# Patient Record
Sex: FEMALE | Race: ASIAN
[De-identification: names, ages, dates, MRNs, and addresses within clinical notes are randomized per-mention and may not be internally consistent; named-entity substitution may affect disease eponyms.]

---

## 2022-04-22 ENCOUNTER — RESULT REVIEW (OUTPATIENT)
Age: 47
End: 2022-04-22

## 2022-05-27 ENCOUNTER — TRANSCRIPTION ENCOUNTER (OUTPATIENT)
Age: 47
End: 2022-05-27

## 2022-05-27 ENCOUNTER — RESULT REVIEW (OUTPATIENT)
Age: 47
End: 2022-05-27

## 2022-06-20 ENCOUNTER — NON-APPOINTMENT (OUTPATIENT)
Age: 47
End: 2022-06-20

## 2022-06-24 ENCOUNTER — OUTPATIENT (OUTPATIENT)
Dept: OUTPATIENT SERVICES | Facility: HOSPITAL | Age: 47
LOS: 1 days | End: 2022-06-24

## 2022-06-24 ENCOUNTER — RESULT REVIEW (OUTPATIENT)
Age: 47
End: 2022-06-24

## 2022-06-24 ENCOUNTER — APPOINTMENT (OUTPATIENT)
Dept: MRI IMAGING | Facility: CLINIC | Age: 47
End: 2022-06-24
Payer: MEDICAID

## 2022-06-24 PROBLEM — Z00.00 ENCOUNTER FOR PREVENTIVE HEALTH EXAMINATION: Status: ACTIVE | Noted: 2022-06-24

## 2022-06-24 PROCEDURE — 19085 BX BREAST 1ST LESION MR IMAG: CPT | Mod: LT

## 2022-06-24 PROCEDURE — 88305 TISSUE EXAM BY PATHOLOGIST: CPT | Mod: 26

## 2022-06-24 PROCEDURE — 77065 DX MAMMO INCL CAD UNI: CPT | Mod: 26,LT

## 2022-06-27 LAB — SURGICAL PATHOLOGY STUDY: SIGNIFICANT CHANGE UP

## 2022-12-22 ENCOUNTER — APPOINTMENT (OUTPATIENT)
Dept: MRI IMAGING | Facility: CLINIC | Age: 47
End: 2022-12-22

## 2022-12-22 ENCOUNTER — OUTPATIENT (OUTPATIENT)
Dept: OUTPATIENT SERVICES | Facility: HOSPITAL | Age: 47
LOS: 1 days | End: 2022-12-22

## 2022-12-22 PROCEDURE — 77049 MRI BREAST C-+ W/CAD BI: CPT | Mod: 26

## 2023-03-08 ENCOUNTER — APPOINTMENT (OUTPATIENT)
Dept: MAMMOGRAPHY | Facility: CLINIC | Age: 48
End: 2023-03-08
Payer: MEDICAID

## 2023-03-08 ENCOUNTER — APPOINTMENT (OUTPATIENT)
Dept: ULTRASOUND IMAGING | Facility: CLINIC | Age: 48
End: 2023-03-08
Payer: MEDICAID

## 2023-03-08 ENCOUNTER — OUTPATIENT (OUTPATIENT)
Dept: OUTPATIENT SERVICES | Facility: HOSPITAL | Age: 48
LOS: 1 days | End: 2023-03-08

## 2023-03-08 PROCEDURE — G0279: CPT | Mod: 26

## 2023-03-08 PROCEDURE — 77066 DX MAMMO INCL CAD BI: CPT | Mod: 26

## 2024-01-20 ENCOUNTER — EMERGENCY (EMERGENCY)
Facility: HOSPITAL | Age: 49
LOS: 0 days | Discharge: HOME | End: 2024-01-20
Attending: STUDENT IN AN ORGANIZED HEALTH CARE EDUCATION/TRAINING PROGRAM
Payer: MEDICAID

## 2024-01-20 VITALS
HEIGHT: 71 IN | RESPIRATION RATE: 18 BRPM | TEMPERATURE: 98 F | DIASTOLIC BLOOD PRESSURE: 67 MMHG | SYSTOLIC BLOOD PRESSURE: 154 MMHG | WEIGHT: 149.91 LBS | HEART RATE: 70 BPM | OXYGEN SATURATION: 100 %
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
## 2024-01-20 DIAGNOSIS — M25.571 PAIN IN RIGHT ANKLE AND JOINTS OF RIGHT FOOT: ICD-10-CM
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
## 2024-01-20 DIAGNOSIS — S82.831A OTHER FRACTURE OF UPPER AND LOWER END OF RIGHT FIBULA, INITIAL ENCOUNTER FOR CLOSED FRACTURE: ICD-10-CM

## 2024-01-20 DIAGNOSIS — M25.561 PAIN IN RIGHT KNEE: ICD-10-CM

## 2024-01-20 PROCEDURE — 73610 X-RAY EXAM OF ANKLE: CPT | Mod: 26,RT

## 2024-01-20 PROCEDURE — 29515 APPLICATION SHORT LEG SPLINT: CPT | Mod: RT

## 2024-01-20 PROCEDURE — 99284 EMERGENCY DEPT VISIT MOD MDM: CPT | Mod: 25

## 2024-01-20 PROCEDURE — 73610 X-RAY EXAM OF ANKLE: CPT | Mod: RT

## 2024-01-20 PROCEDURE — 73590 X-RAY EXAM OF LOWER LEG: CPT | Mod: RT

## 2024-01-20 PROCEDURE — 27786 TREATMENT OF ANKLE FRACTURE: CPT | Mod: 54,RT

## 2024-01-20 PROCEDURE — 99284 EMERGENCY DEPT VISIT MOD MDM: CPT | Mod: 57

## 2024-01-20 PROCEDURE — 73562 X-RAY EXAM OF KNEE 3: CPT | Mod: RT

## 2024-01-20 PROCEDURE — 73590 X-RAY EXAM OF LOWER LEG: CPT | Mod: 26,RT

## 2024-01-20 PROCEDURE — 73562 X-RAY EXAM OF KNEE 3: CPT | Mod: 26,RT

## 2024-01-20 RX ORDER — ACETAMINOPHEN 500 MG
650 TABLET ORAL ONCE
Refills: 0 | Status: COMPLETED | OUTPATIENT
Start: 2024-01-20 | End: 2024-01-20

## 2024-01-20 RX ADMIN — Medication 650 MILLIGRAM(S): at 09:41

## 2024-01-20 NOTE — ED ADULT TRIAGE NOTE - CHIEF COMPLAINT QUOTE
Patient BIBEMS for a slip and fall on ice approximately 1 hour PTA. Patient endorses right ankle pain and immobilized by EMS. Denies head trauma, LOC, or blood thinner use.

## 2024-01-20 NOTE — ED PROVIDER NOTE - PATIENT PORTAL LINK FT
You can access the FollowMyHealth Patient Portal offered by [facility] by registering at the following website: [email or web address]. By joining [employer]’s FollowMyHealth portal, you will also be able to view your health information using other applications (apps) compatible with our system.

## 2024-01-20 NOTE — ED PROVIDER NOTE - CLINICAL SUMMARY MEDICAL DECISION MAKING FREE TEXT BOX
.    49 y/o F pmh breast CA p/w R ankle pain and swelling s/p mechanical fall today, slipping on ice. No LOC, head or neck pain, weakness, numbness, cp, sob. Pt cannot ambulate 2/2 pain. No blood thinners    Pt is NAD; + swelling to R lat mal; + ttp ankle diffusely; 2+ DP pulses, moves toes well; NL sensation; NL ROM proximally, c/o pain to R knee, but non ttp; exam o/w atraumatic and non ttp.    All available lab tests, imaging tests, and EKGs independently reviewed and interpreted by me.   xray + R Distal Fibula frx.  Splint and crutch applied.    Discussed all available results with Pt.  Pt understands results, plan of care, outpt ortho follow up as discussed, splint care, and signs and symptoms for ED return.  Pt is comfortable with discharge. DC home.     .

## 2024-01-20 NOTE — ED PROVIDER NOTE - OBJECTIVE STATEMENT
Pt is a 48 y.o Female with PMHx of right sided breaast cancer s/p radiation, chemotherapy and lumpectomy BIBA to the ED with chief complaint of slip and fall on ice approximately 1 hour PTA. Denies any Head trauma, LOC, AC use. Pt states she was not on ground for very long. Pt complaints of right knee pain and ankle pain. Denies any numbness tingling or focal weakness. Denies any HA, neck pain, back pain, CP, SOB or presyncopal symptoms.

## 2024-01-20 NOTE — ED PROVIDER NOTE - NSFOLLOWUPINSTRUCTIONS_ED_ALL_ED_FT
Our Emergency Department Referral Coordinators will be reaching out to you in the next 24-48 hours from 9:00am to 5:00pm with a follow up appointment. Please expect a phone call from the hospital in that time frame. If you do not receive a call or if you have any questions or concerns, you can reach them at   [phone]    Ankle Fracture    The ankle joint is made up of the lower (distal) sections of the lower leg bones, called the tibia and fibula, along with a bone in the foot called the talus. An ankle fracture is a break in one, two, or all three of these sections of bone. There are two general types of ankle fractures:  Stable fracture. This happens when one of the bones is broken, but the bones of the ankle joint stay in their normal positions.  Unstable fracture. This type can include more than one broken bone. It can also happen if the outer bone is broken and the strong tissues that connect bones to each other (ligaments) are also injured at the inner ankle. This type of fracture allows the talus to move out of its normal position.  What are the causes?  This condition may be caused by:  A hard, direct hit to the ankle.  Quickly and severely twisting your ankle, often while your foot is planted and the rest of your body is moving.  Trauma, such as from a car crash or a fall from a height.  What increases the risk?  The following factors may make you more likely to develop this condition:  Being overweight.  Participating in sports that involve quick direction changes, as in soccer.  Doing high-impact sports such as gymnastics or football.  What are the signs or symptoms?    Symptoms of this condition include:  A tender and swollen ankle.  Bruising around your injured ankle.  Pain when moving or pressing on your ankle.  Trouble walking or using your ankle to support your body weight (putting weight on your ankle).  Pain that gets worse when you move your foot or ankle or when you stand.  Pain that gets better with rest.  How is this diagnosed?  An ankle fracture is usually diagnosed with a physical exam and X-rays. You may also have a CT scan or an MRI.    How is this treated?  Treatment for this condition depends on the type of ankle fracture you have. Stable fractures are treated with a cast, boot, or splint to hold the ankle still and crutches to avoid putting weight on the ankle until the fracture heals. Unstable fractures require surgery to ensure that the bones heal properly. After surgery, you will have a splint. After your incision has healed, your surgeon may give you a cast or a boot. You will not be able to put weight on your injured side for several weeks.    After your ankle has healed, you will do physical therapy exercises to improve movement and strength in your ankle.    Follow these instructions at home:  If you have a boot or splint:    Wear the boot or splint as told by your health care provider. Remove it only as told by your health care provider.  Loosen it if your toes tingle, become numb, or turn cold and blue.  Keep it clean and dry.  If you have a cast:    Do not put pressure on any part of the cast until it is fully hardened. This may take several hours.  Do not stick anything inside the cast to scratch your skin. Doing that increases your risk of infection.  Check the skin around the cast every day. Tell your health care provider about any concerns.  You may put lotion on dry skin around the edges of the cast. Do not put lotion on the skin underneath the cast.  Keep it clean and dry.  Bathing    Do not take baths, swim, or use a hot tub until your health care provider approves. Ask your health care provider if you may take showers. You may only be allowed to take sponge baths.  If the cast, boot, or splint is not waterproof:  Do not let it get wet.  Cover it with a watertight covering when you take a bath or shower.  Managing pain, stiffness, and swelling      If directed, put ice on the injured area. To do this:  If you have a removable splint or boot, remove it as told by your health care provider.  Put ice in a plastic bag.  Place a towel between your skin and the bag or between your cast and the bag.  Leave the ice on for 20 minutes, 2–3 times a day.  Remove the ice if your skin turns bright red. This is very important. If you cannot feel pain, heat, or cold, you have a greater risk of damage to the area.  Move your toes often to reduce stiffness and swelling.  Raise (elevate) the injured area above the level of your heart while you are sitting or lying down.  Activity    Do exercises as told by your health care provider.  Return to your normal activities as told by your health care provider. Ask your health care provider what activities are safe for you.  Do not use the injured limb to support your body weight until your health care provider says that you can. Use crutches as told by your health care provider.  General instructions    Take over-the-counter and prescription medicines only as told by your health care provider.  Ask your health care provider when it is safe to drive if you have a cast, boot, or splint on your ankle.  Do not use any products that contain nicotine or tobacco, such as cigarettes, e-cigarettes, and chewing tobacco. These can delay bone healing. If you need help quitting, ask your health care provider.  Keep all follow-up visits. This is important.  Contact a health care provider if:  You have pain or swelling that gets worse or does not get better with rest or medicine.  Your cast gets damaged.  Get help right away if:  You have severe pain that lasts.  You develop new pain or swelling.  Your skin or toenails below the injury turn blue or gray, feel cold, become numb, or are less sensitive to the touch.  Summary  An ankle fracture can be stable or unstable. This is determined after a physical exam and imaging studies such as X-rays, a CT scan, or an MRI.  Stable fractures are treated with a cast, boot, or splint to hold the ankle still until the fracture heals. Unstable fractures require surgery to ensure that the bones heal properly.  You will not be able to put weight on your injured side for several weeks.  Medicines, icing, and raising (elevating) your injured ankle when you are sitting or lying down may help with pain relief. Follow instructions as told by your health care provider.  This information is not intended to replace advice given to you by your health care provider. Make sure you discuss any questions you have with your health care provider.

## 2024-01-20 NOTE — ED PROVIDER NOTE - PHYSICAL EXAMINATION
CONSTITUTIONAL: NAD  SKIN: Warm dry  HEAD: NCAT  EYES: NL inspection  ENT: MMM  NECK: Supple; non tender.  CARD: RRR  RESP: CTAB  ABD: S/NT no R/G  BACK: nontender  EXT: Lower extremity focused: ROM intact. Strength 5/5 sensation intact. distal pulses intact. + R lateral malleolus ttp +swelling. no obvious open fractures or overlying skin changes. + right knee ttp over patella. no swelling or overlying skin changes or obvious deformities. no calf ttp or edema  NEURO: Grossly unremarkable, CN II-XII intact  PSYCH: Cooperative, appropriate.

## 2024-01-23 ENCOUNTER — APPOINTMENT (OUTPATIENT)
Dept: ORTHOPEDIC SURGERY | Facility: CLINIC | Age: 49
End: 2024-01-23
Payer: MEDICAID

## 2024-01-23 VITALS — WEIGHT: 160 LBS | HEIGHT: 69 IN | BODY MASS INDEX: 23.7 KG/M2

## 2024-01-23 PROCEDURE — 99203 OFFICE O/P NEW LOW 30 MIN: CPT

## 2024-01-23 NOTE — HISTORY OF PRESENT ILLNESS
[de-identified] : 48-year-old female here for evaluation of right ankle fracture.  Patient reports 2 to 3 days ago she fell and inverted her right ankle.  She went to the ER x-rays taken confirm an acute closed nondisplaced fracture of the distal fibula at the level of the syndesmosis.  She was placed in a splint and advised to follow-up with an orthopedic specialist.

## 2024-01-23 NOTE — IMAGING
[de-identified] : Physical examination of right ankle: Moderate swelling appreciated throughout.  No ecchymosis erythema appreciated.  Skin is intact.  Tender palpation along the lateral aspect of the right ankle over the lateral malleolus and ankle ligaments.  No tenderness of the metatarsals or Lisfranc.  No tenderness of the medial malleolus.  Limited range of motion secondary to pain and swelling.  Sensorimotor intact distally.  Neuro vas intact.  Antalgic gait.  DP pulses are palpable.  X-rays of right ankle reviewed from the hospital system: Acute closed nondisplaced fracture of the distal fibula at the level of the syndesmosis indicative of a Kidd B fracture.  No subluxations or dislocations.

## 2024-01-23 NOTE — DISCUSSION/SUMMARY
[de-identified] : Patient has acute closed nondisplaced right distal fibula fracture at the level of the syndesmosis indicative of a Kidd B ankle fracture.  I placed her in a tall cam walker boot in the office today.  Emphasized the importance of remaining nonweightbearing for the right lower extremity at this time.  Prescription for a wheelchair was provided to assist in the nonweightbearing process.  She reports her pain is well-controlled.  She understands fractures take a total of 4 to 6 weeks to fully heal.  She will take Tylenol over-the-counter as needed for pain as she is a cancer patient is unable to take NSAIDs.  Red flag symptoms discussed.   I will have the patient follow-up with Dr. Mclaughlin in 10 days for repeat x-rays and further evaluation/treatment. All questions and concerns addressed to patient's satisfaction. Patient expresses full understanding of treatment plan.

## 2024-02-02 ENCOUNTER — APPOINTMENT (OUTPATIENT)
Dept: ORTHOPEDIC SURGERY | Facility: CLINIC | Age: 49
End: 2024-02-02
Payer: MEDICAID

## 2024-02-02 PROCEDURE — 27786 TREATMENT OF ANKLE FRACTURE: CPT | Mod: RT

## 2024-02-02 PROCEDURE — 73502 X-RAY EXAM HIP UNI 2-3 VIEWS: CPT

## 2024-02-02 PROCEDURE — 99204 OFFICE O/P NEW MOD 45 MIN: CPT | Mod: 57

## 2024-02-02 PROCEDURE — 73610 X-RAY EXAM OF ANKLE: CPT | Mod: RT

## 2024-02-02 NOTE — DISCUSSION/SUMMARY
[de-identified] : I discussed the patient's findings with her.  We can treat this nonsurgically and initiate fracture care.  I like the patient to remain nonweightbearing for another 2 weeks.  She can utilize a boot.  We have authorized a wheelchair for her.  I will see her back in 2 weeks for repeat clinical radiographic exam.  All questions sought and answered.

## 2024-02-02 NOTE — PHYSICAL EXAM
[de-identified] : She is alert oriented x 3.  Pleasant cooperative.  I examined the right lower extremity.  She is tender at the lateral malleolus.  No gross deformity.  She is minimally tender medially.  Grossly intact range of motion.  Neurovascular intact distally.

## 2024-02-02 NOTE — HISTORY OF PRESENT ILLNESS
[de-identified] : 48-year-old patient, to see me in regards to her right ankle injury which she sustained 2 weeks ago.  The exam and history were obtained with the help of a certified official Mandarin .  This injury happened 2 weeks ago.  She localized the pain to the lateral malleolus.  Denies any pain further proximally.  She does have some minimal pain medially.  She has been nonweightbearing.

## 2024-02-02 NOTE — DATA REVIEWED
[FreeTextEntry1] : 3 views of the patient's right ankle ordered reviewed by me personally.  X-rays demonstrate evidence of a Kidd B lateral malleolus fracture without widening of the medial clear space.  There is been no interval displacement compared to hospital films.  We also ordered x-rays of the hip on account of the fact that she was having some hip pain after her fall.  There is no hip fracture seen.

## 2024-02-20 ENCOUNTER — APPOINTMENT (OUTPATIENT)
Dept: ORTHOPEDIC SURGERY | Facility: CLINIC | Age: 49
End: 2024-02-20
Payer: MEDICAID

## 2024-02-20 PROCEDURE — 99213 OFFICE O/P EST LOW 20 MIN: CPT | Mod: 24

## 2024-02-20 PROCEDURE — 73610 X-RAY EXAM OF ANKLE: CPT | Mod: RT

## 2024-02-20 NOTE — HISTORY OF PRESENT ILLNESS
[de-identified] : 48-year-old female is here today accompanied by her son who is translating for follow-up of her right Kidd B lateral malleolus fracture.  She injured the ankle about a month ago.  She has been nonweightbearing in a cam walker boot.  She saw Dr. Mclaughlin 2 weeks ago and he wanted her to come in today for repeat x-rays.  She states she still having pain in the ankle.  She denies any numbness tingling or any calf pain.

## 2024-02-20 NOTE — PHYSICAL EXAM
[de-identified] : On examination of her right ankle she still has some residual swelling and ecchymosis.  She is tender over the lateral malleolus.  No tenderness over the medial malleolus.  She is tender over the ATFL CFL and the deltoid ligament.  No over the talar dome.  No tenderness over the Achilles, negative Tapia's test.  The calf is soft and nontender.  She has limited range of motion of the ankle, she is very stiff.  Sensation is intact throughout the foot.  Palpable DP and PT pulses.  X-rays repeated in the office today of the right ankle continue to show a Kidd B lateral malleolus fracture.  The alignment is unchanged from the previous x-rays, there is no widening of the medial clear space or the syndesmosis.

## 2024-03-01 ENCOUNTER — OUTPATIENT (OUTPATIENT)
Dept: OUTPATIENT SERVICES | Facility: HOSPITAL | Age: 49
LOS: 1 days | End: 2024-03-01

## 2024-03-01 ENCOUNTER — APPOINTMENT (OUTPATIENT)
Dept: MAMMOGRAPHY | Facility: CLINIC | Age: 49
End: 2024-03-01
Payer: COMMERCIAL

## 2024-03-01 PROCEDURE — 77062 BREAST TOMOSYNTHESIS BI: CPT | Mod: 26

## 2024-03-01 PROCEDURE — 77066 DX MAMMO INCL CAD BI: CPT | Mod: 26

## 2024-03-01 PROCEDURE — G0279: CPT | Mod: 26

## 2024-03-08 ENCOUNTER — APPOINTMENT (OUTPATIENT)
Dept: ORTHOPEDIC SURGERY | Facility: CLINIC | Age: 49
End: 2024-03-08
Payer: MEDICAID

## 2024-03-08 PROCEDURE — 73610 X-RAY EXAM OF ANKLE: CPT | Mod: RT

## 2024-03-08 PROCEDURE — 99024 POSTOP FOLLOW-UP VISIT: CPT

## 2024-03-08 NOTE — HISTORY OF PRESENT ILLNESS
[de-identified] : Patient is here for follow-up of her right ankle fracture.  She is doing well.  She does get rubbing of the boot against her fracture that does hurt her.  She has been reluctant to weight-bear outside the boot.  Denies any calf pain chest pain shortness of breath.

## 2024-03-08 NOTE — DATA REVIEWED
[FreeTextEntry1] : 3 views of the right ankle ordered and reviewed by me personally.  X-rays demonstrate evidence of a healing lateral malleolus fracture without interval displacement.  The ankle joint is congruent.

## 2024-03-08 NOTE — DISCUSSION/SUMMARY
[de-identified] : The patient can begin weightbearing outside the boot.  We will continue fracture care.  I will see her back in 1 month.  Of also given her prescription for physical therapy which I would like her to start right away.

## 2024-03-08 NOTE — PHYSICAL EXAM
[de-identified] : Her ankle is tender to palpation laterally but not overly so.  She has restricted range of motion with dorsiflexion to only about neutral.  She is neurovascular intact distally.

## 2024-03-23 ENCOUNTER — TRANSCRIPTION ENCOUNTER (OUTPATIENT)
Age: 49
End: 2024-03-23

## 2024-04-05 ENCOUNTER — APPOINTMENT (OUTPATIENT)
Dept: ORTHOPEDIC SURGERY | Facility: CLINIC | Age: 49
End: 2024-04-05
Payer: COMMERCIAL

## 2024-04-05 PROCEDURE — 73610 X-RAY EXAM OF ANKLE: CPT | Mod: RT

## 2024-04-05 PROCEDURE — 99213 OFFICE O/P EST LOW 20 MIN: CPT | Mod: 24

## 2024-04-05 NOTE — PHYSICAL EXAM
[de-identified] : His swelling is improved.  There is no deformity present.  He has full range of motion.  His ankle is stable.  He is still somewhat tender over the dorsal lateral foot.  Nontender over all bony prominences.  He is neurovascular intact distally.

## 2024-04-05 NOTE — HISTORY OF PRESENT ILLNESS
[de-identified] : Patient comes today for follow-up of her right lateral malleolus fracture.  She is doing well.  She still notes pain with weightbearing.  She localizes both medially and laterally.  She has not done physical therapy yet.  Denies any fevers chills.

## 2024-04-05 NOTE — DISCUSSION/SUMMARY
[de-identified] : I discussed the patient's findings with her.  At this time the patient should start physical therapy.  I recommend she return to her primary for Mandarin speaking therapy as we can better accommodate her needs.  I will see her back in 1 month.  All questions sought and answered.  She can weight-bear as tolerated.
Statement Selected

## 2024-04-05 NOTE — DATA REVIEWED
[FreeTextEntry1] : 3 views of the patient's right ankle ordered reviewed by me personally.  X-rays demonstrate a healed lateral malleolus fracture with congruent ankle mortise.

## 2024-05-08 ENCOUNTER — APPOINTMENT (OUTPATIENT)
Dept: ORTHOPEDIC SURGERY | Facility: CLINIC | Age: 49
End: 2024-05-08
Payer: COMMERCIAL

## 2024-05-08 PROCEDURE — 99213 OFFICE O/P EST LOW 20 MIN: CPT

## 2024-05-08 PROCEDURE — 73610 X-RAY EXAM OF ANKLE: CPT | Mod: RT

## 2024-05-08 NOTE — PHYSICAL EXAM
[de-identified] : She is tender at both the lateral malleolus and medial aspect of the ankle over the deltoid ligament.  She has grossly intact range of motion.  Strength limited by pain.  Neurovascular intact.

## 2024-05-08 NOTE — DATA REVIEWED
[FreeTextEntry1] : 3 views of the patient's right ankle ordered reviewed by me personally.  X-rays demonstrate evidence of a essentially healed lateral malleolus fracture.  The ankle joint is congruent.  There is been no interval shift.  No fracture or dislocation otherwise

## 2024-05-08 NOTE — DISCUSSION/SUMMARY
[de-identified] : Discussed the patient's findings with her.  I would like the patient to obtain an MRI at this time given how long she has had pain and the fact that her pain is located atypically away from where the injury was.  I will see her back in 1 month to review the results of her MRI.  She will continue physical therapy.  All questions are answered

## 2024-05-08 NOTE — HISTORY OF PRESENT ILLNESS
[de-identified] : 48-year-old patient comes in today for follow-up of her right ankle.  She had only 1 session of physical therapy as they last saw her.  She is still describing pain both medially and laterally.  She has been walking outside the boot.  Denies any interval trauma.

## 2024-05-10 ENCOUNTER — APPOINTMENT (OUTPATIENT)
Dept: ORTHOPEDIC SURGERY | Facility: CLINIC | Age: 49
End: 2024-05-10

## 2024-05-29 ENCOUNTER — RESULT REVIEW (OUTPATIENT)
Age: 49
End: 2024-05-29

## 2024-05-29 ENCOUNTER — OUTPATIENT (OUTPATIENT)
Dept: OUTPATIENT SERVICES | Facility: HOSPITAL | Age: 49
LOS: 1 days | End: 2024-05-29
Payer: COMMERCIAL

## 2024-05-29 DIAGNOSIS — S82.831A OTHER FRACTURE OF UPPER AND LOWER END OF RIGHT FIBULA, INITIAL ENCOUNTER FOR CLOSED FRACTURE: ICD-10-CM

## 2024-05-29 DIAGNOSIS — M25.571 PAIN IN RIGHT ANKLE AND JOINTS OF RIGHT FOOT: ICD-10-CM

## 2024-05-29 PROCEDURE — 73721 MRI JNT OF LWR EXTRE W/O DYE: CPT | Mod: 26,RT

## 2024-05-29 PROCEDURE — 73721 MRI JNT OF LWR EXTRE W/O DYE: CPT | Mod: RT

## 2024-05-30 DIAGNOSIS — S82.831A OTHER FRACTURE OF UPPER AND LOWER END OF RIGHT FIBULA, INITIAL ENCOUNTER FOR CLOSED FRACTURE: ICD-10-CM

## 2024-05-30 DIAGNOSIS — M25.571 PAIN IN RIGHT ANKLE AND JOINTS OF RIGHT FOOT: ICD-10-CM

## 2024-06-10 ENCOUNTER — APPOINTMENT (OUTPATIENT)
Dept: ORTHOPEDIC SURGERY | Facility: CLINIC | Age: 49
End: 2024-06-10
Payer: COMMERCIAL

## 2024-06-10 DIAGNOSIS — S82.831A OTHER FRACTURE OF UPPER AND LOWER END OF RIGHT FIBULA, INITIAL ENCOUNTER FOR CLOSED FRACTURE: ICD-10-CM

## 2024-06-10 PROCEDURE — 99213 OFFICE O/P EST LOW 20 MIN: CPT

## 2024-06-10 NOTE — DATA REVIEWED
[FreeTextEntry1] : I reviewed the patient's MRI with her.  MRI demonstrates evidence of a full-thickness ATFL tear.  There is a healing fracture.  No osteochondral lesion.

## 2024-06-10 NOTE — DISCUSSION/SUMMARY
[de-identified] : I discussed the patient's findings with her.  I reassured that she is doing well.  The patient can start weightbearing as tolerated and resume activity as tolerated.  All questions sought and answered Statement Selected

## 2024-06-10 NOTE — PHYSICAL EXAM
[de-identified] : Examination of her right ankle was undertaken.  She is minimally tender.  Her ankle is stable.  She is neurovascular intact.

## 2024-06-10 NOTE — HISTORY OF PRESENT ILLNESS
[de-identified] : This is a 49-year-old patient comes in today for follow-up of her right ankle MRI results.  Actually since last time saw her she has been doing quite well.  She still notes pain but improving.  She does not note any significant instability.

## 2024-06-21 NOTE — ED PROVIDER NOTE - DISCHARGE DATE
New patient Instructions      1. Ensure all pre-operative insurances requirements are complete (ie; dietary visits, psychology consults, primary care documentation, etc)    2. Adhere to pre-operative weight loss / weight maintenance plan discussed in the office today.    3. Contact the office with any questions on pre-operative clearance issues (ie; cardiology work-up, pulmonary work-up, upper GI study, etc).    4. If a barium upper GI study has been ordered for your evaluation, make sure you are on liquids only the morning of the procedure.    20-Jan-2024

## 2025-02-17 ENCOUNTER — APPOINTMENT (OUTPATIENT)
Dept: MAMMOGRAPHY | Facility: CLINIC | Age: 50
End: 2025-02-17
Payer: COMMERCIAL

## 2025-02-17 ENCOUNTER — OUTPATIENT (OUTPATIENT)
Dept: OUTPATIENT SERVICES | Facility: HOSPITAL | Age: 50
LOS: 1 days | End: 2025-02-17

## 2025-02-17 PROCEDURE — 77066 DX MAMMO INCL CAD BI: CPT | Mod: 26

## 2025-02-17 PROCEDURE — G0279: CPT | Mod: 26

## 2025-02-17 PROCEDURE — 77062 BREAST TOMOSYNTHESIS BI: CPT | Mod: 26
